# Patient Record
Sex: FEMALE | Race: ASIAN | NOT HISPANIC OR LATINO | URBAN - METROPOLITAN AREA
[De-identification: names, ages, dates, MRNs, and addresses within clinical notes are randomized per-mention and may not be internally consistent; named-entity substitution may affect disease eponyms.]

---

## 2023-02-03 ENCOUNTER — INPATIENT (INPATIENT)
Facility: HOSPITAL | Age: 53
LOS: 0 days | Discharge: LEFT BEFORE DISCHARGE | DRG: 149 | End: 2023-02-04
Attending: INTERNAL MEDICINE | Admitting: INTERNAL MEDICINE
Payer: COMMERCIAL

## 2023-02-03 VITALS
RESPIRATION RATE: 18 BRPM | OXYGEN SATURATION: 95 % | SYSTOLIC BLOOD PRESSURE: 153 MMHG | TEMPERATURE: 98 F | HEART RATE: 94 BPM | DIASTOLIC BLOOD PRESSURE: 88 MMHG | WEIGHT: 190.04 LBS

## 2023-02-03 DIAGNOSIS — N93.8 OTHER SPECIFIED ABNORMAL UTERINE AND VAGINAL BLEEDING: ICD-10-CM

## 2023-02-03 DIAGNOSIS — Z29.9 ENCOUNTER FOR PROPHYLACTIC MEASURES, UNSPECIFIED: ICD-10-CM

## 2023-02-03 DIAGNOSIS — R21 RASH AND OTHER NONSPECIFIC SKIN ERUPTION: ICD-10-CM

## 2023-02-03 DIAGNOSIS — R94.31 ABNORMAL ELECTROCARDIOGRAM [ECG] [EKG]: ICD-10-CM

## 2023-02-03 DIAGNOSIS — Z98.890 OTHER SPECIFIED POSTPROCEDURAL STATES: Chronic | ICD-10-CM

## 2023-02-03 DIAGNOSIS — R55 SYNCOPE AND COLLAPSE: ICD-10-CM

## 2023-02-03 LAB
ALBUMIN SERPL ELPH-MCNC: 3.8 G/DL — SIGNIFICANT CHANGE UP (ref 3.5–5)
ALP SERPL-CCNC: 53 U/L — SIGNIFICANT CHANGE UP (ref 40–120)
ALT FLD-CCNC: 35 U/L DA — SIGNIFICANT CHANGE UP (ref 10–60)
ANION GAP SERPL CALC-SCNC: 6 MMOL/L — SIGNIFICANT CHANGE UP (ref 5–17)
ANISOCYTOSIS BLD QL: SLIGHT — SIGNIFICANT CHANGE UP
AST SERPL-CCNC: 23 U/L — SIGNIFICANT CHANGE UP (ref 10–40)
BASOPHILS # BLD AUTO: 0.07 K/UL — SIGNIFICANT CHANGE UP (ref 0–0.2)
BASOPHILS NFR BLD AUTO: 1.4 % — SIGNIFICANT CHANGE UP (ref 0–2)
BILIRUB SERPL-MCNC: 0.3 MG/DL — SIGNIFICANT CHANGE UP (ref 0.2–1.2)
BUN SERPL-MCNC: 10 MG/DL — SIGNIFICANT CHANGE UP (ref 7–18)
CALCIUM SERPL-MCNC: 9.3 MG/DL — SIGNIFICANT CHANGE UP (ref 8.4–10.5)
CHLORIDE SERPL-SCNC: 105 MMOL/L — SIGNIFICANT CHANGE UP (ref 96–108)
CK MB BLD-MCNC: <1.3 % — SIGNIFICANT CHANGE UP (ref 0–3.5)
CK MB CFR SERPL CALC: <1 NG/ML — SIGNIFICANT CHANGE UP (ref 0–3.6)
CK SERPL-CCNC: 75 U/L — SIGNIFICANT CHANGE UP (ref 21–215)
CO2 SERPL-SCNC: 25 MMOL/L — SIGNIFICANT CHANGE UP (ref 22–31)
CREAT SERPL-MCNC: 0.8 MG/DL — SIGNIFICANT CHANGE UP (ref 0.5–1.3)
EGFR: 88 ML/MIN/1.73M2 — SIGNIFICANT CHANGE UP
ELLIPTOCYTES BLD QL SMEAR: SLIGHT — SIGNIFICANT CHANGE UP
EOSINOPHIL # BLD AUTO: 0.09 K/UL — SIGNIFICANT CHANGE UP (ref 0–0.5)
EOSINOPHIL NFR BLD AUTO: 1.8 % — SIGNIFICANT CHANGE UP (ref 0–6)
FLUAV AG NPH QL: SIGNIFICANT CHANGE UP
FLUBV AG NPH QL: SIGNIFICANT CHANGE UP
GLUCOSE SERPL-MCNC: 112 MG/DL — HIGH (ref 70–99)
HCT VFR BLD CALC: 43.5 % — SIGNIFICANT CHANGE UP (ref 34.5–45)
HGB BLD-MCNC: 13.3 G/DL — SIGNIFICANT CHANGE UP (ref 11.5–15.5)
HYPOCHROMIA BLD QL: SLIGHT — SIGNIFICANT CHANGE UP
IMM GRANULOCYTES NFR BLD AUTO: 0.2 % — SIGNIFICANT CHANGE UP (ref 0–0.9)
LYMPHOCYTES # BLD AUTO: 1.23 K/UL — SIGNIFICANT CHANGE UP (ref 1–3.3)
LYMPHOCYTES # BLD AUTO: 24.4 % — SIGNIFICANT CHANGE UP (ref 13–44)
MANUAL SMEAR VERIFICATION: SIGNIFICANT CHANGE UP
MCHC RBC-ENTMCNC: 22.1 PG — LOW (ref 27–34)
MCHC RBC-ENTMCNC: 30.6 GM/DL — LOW (ref 32–36)
MCV RBC AUTO: 72.4 FL — LOW (ref 80–100)
MICROCYTES BLD QL: SLIGHT — SIGNIFICANT CHANGE UP
MONOCYTES # BLD AUTO: 0.54 K/UL — SIGNIFICANT CHANGE UP (ref 0–0.9)
MONOCYTES NFR BLD AUTO: 10.7 % — SIGNIFICANT CHANGE UP (ref 2–14)
NEUTROPHILS # BLD AUTO: 3.1 K/UL — SIGNIFICANT CHANGE UP (ref 1.8–7.4)
NEUTROPHILS NFR BLD AUTO: 61.5 % — SIGNIFICANT CHANGE UP (ref 43–77)
NRBC # BLD: 0 /100 WBCS — SIGNIFICANT CHANGE UP (ref 0–0)
OVALOCYTES BLD QL SMEAR: SLIGHT — SIGNIFICANT CHANGE UP
PLAT MORPH BLD: NORMAL — SIGNIFICANT CHANGE UP
PLATELET # BLD AUTO: 299 K/UL — SIGNIFICANT CHANGE UP (ref 150–400)
PLATELET COUNT - ESTIMATE: NORMAL — SIGNIFICANT CHANGE UP
POIKILOCYTOSIS BLD QL AUTO: SLIGHT — SIGNIFICANT CHANGE UP
POLYCHROMASIA BLD QL SMEAR: SLIGHT — SIGNIFICANT CHANGE UP
POTASSIUM SERPL-MCNC: 3.7 MMOL/L — SIGNIFICANT CHANGE UP (ref 3.5–5.3)
POTASSIUM SERPL-SCNC: 3.7 MMOL/L — SIGNIFICANT CHANGE UP (ref 3.5–5.3)
PROT SERPL-MCNC: 8 G/DL — SIGNIFICANT CHANGE UP (ref 6–8.3)
RBC # BLD: 6.01 M/UL — HIGH (ref 3.8–5.2)
RBC # FLD: 14.5 % — SIGNIFICANT CHANGE UP (ref 10.3–14.5)
RBC BLD AUTO: ABNORMAL
SARS-COV-2 RNA SPEC QL NAA+PROBE: SIGNIFICANT CHANGE UP
SODIUM SERPL-SCNC: 136 MMOL/L — SIGNIFICANT CHANGE UP (ref 135–145)
TROPONIN I, HIGH SENSITIVITY RESULT: 5.4 NG/L — SIGNIFICANT CHANGE UP
TROPONIN I, HIGH SENSITIVITY RESULT: 6 NG/L — SIGNIFICANT CHANGE UP
WBC # BLD: 5.04 K/UL — SIGNIFICANT CHANGE UP (ref 3.8–10.5)
WBC # FLD AUTO: 5.04 K/UL — SIGNIFICANT CHANGE UP (ref 3.8–10.5)

## 2023-02-03 PROCEDURE — 99285 EMERGENCY DEPT VISIT HI MDM: CPT

## 2023-02-03 PROCEDURE — 70450 CT HEAD/BRAIN W/O DYE: CPT | Mod: 26

## 2023-02-03 PROCEDURE — 99223 1ST HOSP IP/OBS HIGH 75: CPT

## 2023-02-03 PROCEDURE — 99223 1ST HOSP IP/OBS HIGH 75: CPT | Mod: GC

## 2023-02-03 PROCEDURE — 71045 X-RAY EXAM CHEST 1 VIEW: CPT | Mod: 26

## 2023-02-03 RX ORDER — ENOXAPARIN SODIUM 100 MG/ML
40 INJECTION SUBCUTANEOUS EVERY 24 HOURS
Refills: 0 | Status: DISCONTINUED | OUTPATIENT
Start: 2023-02-03 | End: 2023-02-04

## 2023-02-03 RX ORDER — MECLIZINE HCL 12.5 MG
25 TABLET ORAL ONCE
Refills: 0 | Status: COMPLETED | OUTPATIENT
Start: 2023-02-03 | End: 2023-02-03

## 2023-02-03 RX ADMIN — ENOXAPARIN SODIUM 40 MILLIGRAM(S): 100 INJECTION SUBCUTANEOUS at 21:18

## 2023-02-03 RX ADMIN — Medication 25 MILLIGRAM(S): at 14:50

## 2023-02-03 NOTE — H&P ADULT - HISTORY OF PRESENT ILLNESS
Patient is a 53 y/o F, lives alone at home, with no significant PMHx, s/p myomectomy. She had sudden onset of dizziness while working from home. She had a feeling of lightheadedness, loss of balance and unsteadiness. She also states that she has warm burning sensation over her chest. She denies any previous episodes. She had no headache, blurring of vision, head trauma, nausea/vomiting, shortness of breath, palpitation, abdominal pain, diarrhea, dysuria. In the ED, VS showed elevated /88. EKG showed NSR, TWI in V1-V6. Troponin was negative. CTH was ordered. Of note, LMP was 12/2022. She states that she is having vaginal spotting. She attributes some of her symptoms to pre-menopause.    GOC: FULL CODE

## 2023-02-03 NOTE — H&P ADULT - NSHPREVIEWOFSYSTEMS_GEN_ALL_CORE
- CONSTITUTIONAL: Denies fever and chills  - HEENT: Denies changes in vision and hearing.  - RESPIRATORY: Denies SOB and cough.  - CV: Denies chest pain and palpitations  - GI: Denies abdominal pain, nausea, vomiting and diarrhea.  - : Denies dysuria and urinary frequency.  - SKIN: Denies rash and pruritus.  - NEUROLOGICAL: (+) dizziness; Denies headache and syncope.  - PSYCHIATRIC: Denies recent changes in mood. Denies anxiety and depression. - CONSTITUTIONAL: Denies fever and chills  - HEENT: Denies changes in vision and hearing.  - RESPIRATORY: Denies SOB and cough.  - CV: Denies chest pain and palpitations  - GI: Denies abdominal pain, nausea, vomiting and diarrhea.  - : Denies dysuria and urinary frequency.  - SKIN: (+) rashes  - NEUROLOGICAL: (+) dizziness; Denies headache and syncope.  - PSYCHIATRIC: Denies recent changes in mood. Denies anxiety and depression.

## 2023-02-03 NOTE — H&P ADULT - ATTENDING COMMENTS
Patient is admitted with c/o dizziness and feeling hot.  Symptoms occurred suddenly while she was at work on her computer.     PMH is negative Patient is admitted with c/o dizziness and feeling hot.  Symptoms occurred suddenly while she was at work on her computer.     PMH:  fibroids    ROS:  skipper periods, previously heavy,  out of care for several years              increase in pruritic rash    Alert, cooperative woman  vs, as above  PE, as above    EKG, NSR, prolonged QT, T wave inversions in precordial leads                            13.3   5.04  )-----------( 299      ( 03 Feb 2023 14:48 )             43.5       02-03    136  |  105  |  10  ----------------------------<  112<H>  3.7   |  25  |  0.80    Ca    9.3      03 Feb 2023 14:48    TPro  8.0  /  Alb  3.8  /  TBili  0.3  /  DBili  x   /  AST  23  /  ALT  35  /  AlkPhos  53  02-03       CARDIAC MARKERS ( 03 Feb 2023 17:20 )  x     / x     / 75 U/L / x     / <1.0 ng/mL    CAPILLARY BLOOD GLUCOSE      POCT Blood Glucose.: 98 mg/dL (03 Feb 2023 14:00)    < from: CT Head No Cont (02.03.23 @ 17:44) >    IMPRESSION:  No acute intracranial hemorrhage or acute territorial infarction.    < end of copied text >    < from: Xray Chest 1 View- PORTABLE-Urgent (02.03.23 @ 16:03) >    IMPRESSION: Clear lungs.    < end of copied text >    IMP:  Acute dizziness and vasomotor symptoms may reflect menopause.             Abnormal EKG may represent a chronic ischemia, r/o ACS            Nodular rash            Uterine hypertrophy is most likely fibroids.  Patient will need outpatient f/u to exclude uterine malignancy.  Plan: Tele/troponin/echo/Cardiology, Dr. Beach             Refer to outpatient GYN and primary care upon discharge.

## 2023-02-03 NOTE — H&P ADULT - ASSESSMENT
Patient is a 51 y/o F, lives alone at home, with no significant PMHx, s/p myomectomy. Admitted for pre-syncope.

## 2023-02-03 NOTE — H&P ADULT - PROBLEM SELECTOR PLAN 1
p/w pre-syncope, dizziness, lightheadedness, unsteadiness  orthostatic bp  Admit to Telemetry  EKG shows - NSR, TWI  Trop - neg  Neuro consulted: Dr. Denise  Cardio consulted: Dr. Beach  f/u echo  f/u CT head

## 2023-02-03 NOTE — PATIENT PROFILE ADULT - FALL HARM RISK - HARM RISK INTERVENTIONS

## 2023-02-03 NOTE — H&P ADULT - PROBLEM SELECTOR PLAN 3
Lovenox 40 mg SQ for DVt prophylaxis multiple nodular excorations on the arms, legs  could be a sign of systemic disease  follow-up with Dermatology as outpatient

## 2023-02-03 NOTE — ED PROVIDER NOTE - OBJECTIVE STATEMENT
52-year-old female with no significant past medical history presents for dizziness.  She woke up in her usual state at 8 AM after going to bed last night in her usual state and at around 10 AM developed sudden onset of dizziness.  She describes it as a lightheaded feeling.  She denies any weakness or numbness.  She says there is no associated change in vision or incontinence of bowel or bladder.  She not take any medications.  She notes that improves at rest the episode lasted until she arrived here in the emergency department.  Currently still feels a little bit of unsteadiness.  She denies any headache.  There is no chest pain or shortness of breath.  There is no fever or chills.  There is no urinary symptoms.  There is no back pain.  This is the first time she is experiencing this.  She not take any medications at home for this.

## 2023-02-03 NOTE — H&P ADULT - PROBLEM SELECTOR PLAN 5
Patient has palpably enlarged uterus.  She has had heavy periods and a PMH of fibroids, reduced surgically.   She has been out of care for almost ten years.  She will need GYN evaluation as outpatient.

## 2023-02-03 NOTE — PATIENT PROFILE ADULT - WILL THE PATIENT ACCEPT THE PFIZER COVID-19 VACCINE IF ELIGIBLE AND IT IS AVAILABLE?
Order received for a new evaluation.    Are there any red flags that may impact the assessment or management of the patient?   Other - Use Comments. broke pain agreement few years ago  N/A        Routing to review.    Brianda BUSTAMANTE    [facility]     No

## 2023-02-03 NOTE — ED PROVIDER NOTE - PHYSICAL EXAMINATION
Gen. no acute distress, no respiratory  HEENT: Pupils equally round reactive to light at 2 mm, extraocular muscles intact, left nystagmus fatigable, unidirectional.  Lungs: Bilateral breath sounds with clear to auscultation  CVS: S1S2   Abd; soft nontender nondistended  Ext: No edema no erythema  Neuro: Awake alert and oriented x3, no pronator drift, steady gait, normal finger-to-nose, no sensory deficits, clear speech  MSK: Strength is 5 out of 5 for both of her upper extremities and lower extremities including arms and hands and wrists and feet and ankle and knee

## 2023-02-03 NOTE — H&P ADULT - PROBLEM SELECTOR PLAN 2
p/w pre-syncope, dizziness, lightheadedness, unsteadiness  EKG shows - NSR, TWI  Trop - neg  f/u Trop x 2  Cardio consulted: Dr. Beach

## 2023-02-03 NOTE — H&P ADULT - NSHPPHYSICALEXAM_GEN_ALL_CORE
Vital Signs  · BP - 153/88 mm Hg  · Heart Rate - 94 /min  · Respiration Rate - 18 /min  · Temp - 98.2 F (36.8 C)  · SpO2 (%)	 - 95 %    PHYSICAL EXAM:  GENERAL: NAD, speaks in full sentences, no signs of respiratory distress  HEAD:  Atraumatic, Normocephalic  EYES: EOMI, PERRLA, conjunctiva and sclera clear  NECK: Supple, No JVD  CHEST/LUNG: Clear to auscultation bilaterally; No wheeze; No crackles; No accessory muscles used  HEART: Regular rate and rhythm; No murmurs;   ABDOMEN: Soft, Nontender, Nondistended; Bowel sounds present; No guarding  EXTREMITIES:  2+ Peripheral Pulses, No cyanosis or edema  PSYCH: AAOx3  NEUROLOGY: non-focal  SKIN: (+) multiple nodular excorations Vital Signs  · BP - 153/88 mm Hg  · Heart Rate - 94 /min  · Respiration Rate - 18 /min  · Temp - 98.2 F (36.8 C)  · SpO2 (%)	 - 95 %    PHYSICAL EXAM:  GENERAL: NAD, speaks in full sentences, no signs of respiratory distress  HEAD:  Atraumatic, Normocephalic  EYES: EOMI, PERRLA, conjunctiva and sclera clear  NECK: Supple, No JVD  CHEST/LUNG: Clear to auscultation bilaterally; No wheeze; No crackles; No accessory muscles used  HEART: Regular rate and rhythm; No murmurs;   ABDOMEN: Soft, Nontender, Nondistended; Bowel sounds present; No guarding; (+) uterine fullness  EXTREMITIES:  2+ Peripheral Pulses, No cyanosis or edema  PSYCH: AAOx3  NEUROLOGY: non-focal  SKIN: (+) multiple nodular excorations

## 2023-02-03 NOTE — ED ADULT NURSE NOTE - NSIMPLEMENTINTERV_GEN_ALL_ED
Implemented All Universal Safety Interventions:  [place] to call system. Call bell, personal items and telephone within reach. Instruct patient to call for assistance. Room bathroom lighting operational. Non-slip footwear when patient is off stretcher. Physically safe environment: no spills, clutter or unnecessary equipment. Stretcher in lowest position, wheels locked, appropriate side rails in place.

## 2023-02-03 NOTE — PATIENT PROFILE ADULT - MONEY FOR FOOD
2/2 COVID-19. Now with new infiltrates RML/RLL, new fevers concerning for superimposed aspiration/bacterial PNA. Blood/sputum cultures unrevealing. Still intermittently febrile.     - s/p 5 days of remdesivir +  dexamethasone  - Wean O2 as tolerated. Alternate vapotherm + NIV   - Lasix spot dose as needed- Maintains negative fluid balance.   - holding full dose Lovenox for elevated d-dimer in setting of suspected lower GI bleed  -- continue zosyn.. Duration 7 days for presumed aspiration PNA.     Renal function remains stable. Not thriving.. Worried about long term and if he will survive this hospitalization.. Last few days have been challenging for him, but clinically better this AM>    no

## 2023-02-03 NOTE — CONSULT NOTE ADULT - SUBJECTIVE AND OBJECTIVE BOX
NEUROLOGY CONSULT NOTE    NAME:  MADIHA JOY      ASSESSMENT:  52 RHF with new-onset dizziness, likely pre-syncopal event, without clinical or neuroimaging evidence for seizure or ischemic stroke      RECOMMENDATIONS:    - Monitor orthostatic BP & HR with each vital signs check    - Cardiovascular workup: Telemetry, Echocardiogram, Cardiology follow-up    - Plentiful fluid hydration (2 liters, or 8 glasses, of water daily) encouraged    - Patient counseled to maintain caution with rapid changes in position    - DVT ppx: SCDs, Heparin or Enoxaparin          NOTE TO BE COMPLETED - PLEASE REFER TO ABOVE ONLY AND IGNORE INFORMATION BELOW    *******************************      CHIEF COMPLAINT:  Patient is a 52y old  Female who presents with a chief complaint of pre-syncope (03 Feb 2023 16:43)      HPI:  Patient is a 51 y/o F, lives alone at home, with no significant PMHx, s/p myomectomy. She had sudden onset of dizziness while working from home. She had a feeling of lightheadedness, loss of balance and unsteadiness. She also states that she has warm burning sensation over her chest. She denies any previous episodes. She had no headache, blurring of vision, head trauma, nausea/vomiting, shortness of breath, palpitation, abdominal pain, diarrhea, dysuria. In the ED, VS showed elevated /88. EKG showed NSR, TWI in V1-V6. Troponin was negative. CTH was ordered. Of note, LMP was 12/2022. She states that she is having vaginal spotting. She attributes some of her symptoms to pre-menopause.    GOC: FULL CODE (03 Feb 2023 16:43)      NEURO HPI:      PAST MEDICAL & SURGICAL HISTORY:  No pertinent past medical history  H/O myomectomy      MEDICATIONS:  enoxaparin Injectable 40 milliGRAM(s) SubCutaneous every 24 hours      ALLERGIES:  No Known Allergies      FAMILY HISTORY:  FH: hyperlipidemia (Mother)          SOCIAL HISTORY:  Denies alcohol, tobacco, or illicit drug use      REVIEW OF SYSTEMS:  GENERAL: No fever, weight changes, fatigue  EYES: No eye pain or discharge  EAR/NOSE/MOUTH/THROAT: No sinus or throat pain; No difficulty hearing  NECK: No pain or stiffness  RESPIRATORY: No cough, wheezing, chills, or hemoptysis  CARDIOVASCULAR: No chest pain, palpitations, shortness of breath, or dyspnea on exertion  GASTROINTESTINAL: No abdominal pain, nausea, vomiting, hematemesis, diarrhea, or constipation  GENITOURINARY: No dysuria, frequency, hematuria, or incontinence  SKIN: No rashes or lesions  ENDOCRINE: No heat or cold intolerance  HEMATOLOGIC: No easy bruising or bleeding  PSYCHIATRIC: No depression, anxiety, or mood swings  MUSCULOSKELETAL: No joint pain or swelling  NEUROLOGICAL: As per HPI          OBJECTIVE:    Vital Signs Last 24 Hrs  T(C): 36.7 (03 Feb 2023 23:45), Max: 37.1 (03 Feb 2023 18:30)  T(F): 98 (03 Feb 2023 23:45), Max: 98.7 (03 Feb 2023 18:30)  HR: 74 (03 Feb 2023 23:45) (74 - 94)  BP: 124/60 (03 Feb 2023 23:45) (109/47 - 153/88)  RR: 18 (03 Feb 2023 23:45) (18 - 18)  SpO2: 99% (03 Feb 2023 23:45) (95% - 100%)  Parameters below as of 03 Feb 2023 23:45  Patient On (Oxygen Delivery Method): room air      General Examination:  General: No acute distress  HEENT: Atraumatic, Normocephalic  Respiratory: CTA B/l.  No crackles, rhonchi, or wheezes.  Cardiovascular: RRR.  Normal S1 & S2.  Normal b/l radial and pedal pulses.    Neurological Examination:  General / Mental Status: AAO x 3.  No aphasia or dysarthria.  Naming and repetition intact.  Cranial Nerves: VFF x 4.  PERRL.  EOMI x 2, No nystagmus or diplopia.  B/l V1-V3 equal and intact to light touch and pinprick.  Symmetric facial movement and palate elevation.  B/l hearing equal to finger rub.  5/5 strength with b/l sternocleidomastoid & trapezius.  Midline tongue protrusion, with no atrophy or fasciculations.  Motor: Normal bulk & tone in all four extremities.  5/5 strength throughout all four extremities.  No downward drift, rigidity, spasticity, or tremors in any of the four extremities.  Sensory: Intact to light touch and pinprick in all four extremities.  Negative Romberg.  Reflex: 2+ and symmetric at b/l biceps, triceps, brachioradialis, patellae, and ankles.  Downgoing toes b/l.  Coordination: No dysmetria with b/l finger-to-nose and heel raise tests.  Symmetric rapid alternating movements b/l.  Gait: Normal, narrow-based gait.  No difficulty with tiptoe, heel, and tandem gaits.        LABORATORY VALUES:                        13.3   5.04  )-----------( 299      ( 03 Feb 2023 14:48 )             43.5       02-03    136  |  105  |  10  ----------------------------<  112<H>  3.7   |  25  |  0.80    Ca    9.3      03 Feb 2023 14:48    TPro  8.0  /  Alb  3.8  /  TBili  0.3  /  DBili  x   /  AST  23  /  ALT  35  /  AlkPhos  53  02-03                              NEUROIMAGING:          Please contact the Neurology consult service with any neurological questions.    Junior Denise MD   of Neurology  [facility] NEUROLOGY CONSULT NOTE    NAME:  MADIHA JOY      ASSESSMENT:  52 RHF with new-onset dizziness, likely pre-syncopal event, also with neuroimaging evidence of chronic ischemic stroke        RECOMMENDATIONS:    - Monitor orthostatic BP & HR with each vital signs check    - Cardiovascular workup: Telemetry, Echocardiogram, Cardiology follow-up    - Plentiful fluid hydration (2 liters, or 8 glasses, of water daily) encouraged    - Patient counseled to maintain caution with rapid changes in position    - Maintain at minimum Aspirin 81mg PO Daily and Atorvastatin 40mg PO QHS for secondary stroke prevention given presence of chronic infarct    - DVT ppx: SCDs, Heparin or Enoxaparin          *******************************      CHIEF COMPLAINT:  Patient is a 52y old  Female who presents with a chief complaint of pre-syncope (03 Feb 2023 16:43)      HPI:  Patient is a 53 y/o F, lives alone at home, with no significant PMHx, s/p myomectomy. She had sudden onset of dizziness while working from home. She had a feeling of lightheadedness, loss of balance and unsteadiness. She also states that she has warm burning sensation over her chest. She denies any previous episodes. She had no headache, blurring of vision, head trauma, nausea/vomiting, shortness of breath, palpitation, abdominal pain, diarrhea, dysuria. In the ED, VS showed elevated /88. EKG showed NSR, TWI in V1-V6. Troponin was negative. CTH was ordered. Of note, LMP was 12/2022. She states that she is having vaginal spotting. She attributes some of her symptoms to pre-menopause. GOC: FULL CODE (03 Feb 2023 16:43)      NEURO HPI:  52 RHF with new onset dizziness, described as lightheadedness but not as room-spinning sensation, also with burning sensation in the chest. Her dizziness has improved in the ED after receiving Meclizine 25mg PO x 1.      PAST MEDICAL & SURGICAL HISTORY:  No pertinent past medical history  H/O myomectomy      MEDICATIONS:  enoxaparin Injectable 40 milliGRAM(s) SubCutaneous every 24 hours      ALLERGIES:  No Known Allergies      FAMILY HISTORY:  FH: hyperlipidemia (Mother)      SOCIAL HISTORY:  Denies alcohol, tobacco, or illicit drug use      REVIEW OF SYSTEMS:  GENERAL: No fever, weight changes, fatigue  EYES: No eye pain or discharge  EAR/NOSE/MOUTH/THROAT: No sinus or throat pain; No difficulty hearing  NECK: No pain or stiffness  RESPIRATORY: No cough, wheezing, chills, or hemoptysis  CARDIOVASCULAR: Recent burning chest pain; No palpitations, shortness of breath, or dyspnea on exertion  GASTROINTESTINAL: No abdominal pain, nausea, vomiting, hematemesis, diarrhea, or constipation  GENITOURINARY: Recent vaginal spotting; No dysuria, frequency, hematuria, or incontinence  SKIN: No rashes or lesions  ENDOCRINE: No heat or cold intolerance  HEMATOLOGIC: No easy bruising or bleeding  PSYCHIATRIC: No depression, anxiety, or mood swings  MUSCULOSKELETAL: No joint pain or swelling  NEUROLOGICAL: As per HPI          OBJECTIVE:    Vital Signs Last 24 Hrs  T(C): 36.7 (03 Feb 2023 23:45), Max: 37.1 (03 Feb 2023 18:30)  T(F): 98 (03 Feb 2023 23:45), Max: 98.7 (03 Feb 2023 18:30)  HR: 74 (03 Feb 2023 23:45) (74 - 94)  BP: 124/60 (03 Feb 2023 23:45) (109/47 - 153/88)  RR: 18 (03 Feb 2023 23:45) (18 - 18)  SpO2: 99% (03 Feb 2023 23:45) (95% - 100%)  Parameters below as of 03 Feb 2023 23:45  Patient On (Oxygen Delivery Method): room air      General Examination:  General: No acute distress  HEENT: Atraumatic, Normocephalic  Respiratory: CTA B/l.  No crackles, rhonchi, or wheezes.  Cardiovascular: RRR.  Normal S1 & S2.  Normal b/l radial and pedal pulses.    Neurological Examination:  General / Mental Status: AAO x 3.  No aphasia or dysarthria.  Naming and repetition intact.  Cranial Nerves: VFF x 4.  PERRL.  EOMI x 2, No nystagmus or diplopia.  B/l V1-V3 equal and intact to light touch and pinprick.  Symmetric facial movement and palate elevation.  B/l hearing equal to finger rub.  5/5 strength with b/l sternocleidomastoid & trapezius.  Midline tongue protrusion, with no atrophy or fasciculations.  Motor: Normal bulk & tone in all four extremities.  5/5 strength throughout all four extremities.  No downward drift, rigidity, spasticity, or tremors in any of the four extremities.  Sensory: Intact to light touch and pinprick in all four extremities.  Negative Romberg.  Reflex: 2+ and symmetric at b/l biceps, triceps, brachioradialis, patellae, and ankles.  Downgoing toes b/l.  Coordination: No dysmetria with b/l finger-to-nose and heel raise tests.  Symmetric rapid alternating movements b/l.  Gait: Normal, narrow-based gait.  No difficulty with tiptoe, heel, and tandem gaits.    NIHSS 0  MRS 1          LABORATORY VALUES:                        13.3   5.04  )-----------( 299      ( 03 Feb 2023 14:48 )             43.5       02-03    136  |  105  |  10  ----------------------------<  112<H>  3.7   |  25  |  0.80    Ca    9.3      03 Feb 2023 14:48    TPro  8.0  /  Alb  3.8  /  TBili  0.3  /  DBili  x   /  AST  23  /  ALT  35  /  AlkPhos  53  02-03            NEUROIMAGING:      CT Head (2/3/23):  - No acute intracranial abnoramlity  - Chronic left basal ganglia infarct          Please contact the Neurology consult service with any neurological questions.    Junior Denise MD   of Neurology  [facility]

## 2023-02-03 NOTE — CONSULT NOTE ADULT - TIME BILLING
I counseled the patient about her diagnoses and the appropriate medications and conservative steps indicated to manage his symptoms.

## 2023-02-03 NOTE — ED PROVIDER NOTE - CLINICAL SUMMARY MEDICAL DECISION MAKING FREE TEXT BOX
ATTG: : Assessment/plan: Dizziness unclear etiology with an abnormal EKG, will check labs we will check x-ray we will check cardiac work-up we will check CT head and reeval for dispo

## 2023-02-04 ENCOUNTER — TRANSCRIPTION ENCOUNTER (OUTPATIENT)
Age: 53
End: 2023-02-04

## 2023-02-04 VITALS — SYSTOLIC BLOOD PRESSURE: 102 MMHG | HEART RATE: 80 BPM | DIASTOLIC BLOOD PRESSURE: 55 MMHG

## 2023-02-04 LAB
ALBUMIN SERPL ELPH-MCNC: 3.3 G/DL — LOW (ref 3.5–5)
ALP SERPL-CCNC: 41 U/L — SIGNIFICANT CHANGE UP (ref 40–120)
ALT FLD-CCNC: 31 U/L DA — SIGNIFICANT CHANGE UP (ref 10–60)
ANION GAP SERPL CALC-SCNC: 6 MMOL/L — SIGNIFICANT CHANGE UP (ref 5–17)
AST SERPL-CCNC: 20 U/L — SIGNIFICANT CHANGE UP (ref 10–40)
BASOPHILS # BLD AUTO: 0.06 K/UL — SIGNIFICANT CHANGE UP (ref 0–0.2)
BASOPHILS NFR BLD AUTO: 1 % — SIGNIFICANT CHANGE UP (ref 0–2)
BILIRUB SERPL-MCNC: 0.5 MG/DL — SIGNIFICANT CHANGE UP (ref 0.2–1.2)
BUN SERPL-MCNC: 11 MG/DL — SIGNIFICANT CHANGE UP (ref 7–18)
CALCIUM SERPL-MCNC: 9.3 MG/DL — SIGNIFICANT CHANGE UP (ref 8.4–10.5)
CHLORIDE SERPL-SCNC: 107 MMOL/L — SIGNIFICANT CHANGE UP (ref 96–108)
CO2 SERPL-SCNC: 27 MMOL/L — SIGNIFICANT CHANGE UP (ref 22–31)
CREAT SERPL-MCNC: 0.83 MG/DL — SIGNIFICANT CHANGE UP (ref 0.5–1.3)
EGFR: 84 ML/MIN/1.73M2 — SIGNIFICANT CHANGE UP
EOSINOPHIL # BLD AUTO: 0.2 K/UL — SIGNIFICANT CHANGE UP (ref 0–0.5)
EOSINOPHIL NFR BLD AUTO: 3.3 % — SIGNIFICANT CHANGE UP (ref 0–6)
GLUCOSE SERPL-MCNC: 98 MG/DL — SIGNIFICANT CHANGE UP (ref 70–99)
HCT VFR BLD CALC: 38.8 % — SIGNIFICANT CHANGE UP (ref 34.5–45)
HGB BLD-MCNC: 11.7 G/DL — SIGNIFICANT CHANGE UP (ref 11.5–15.5)
IMM GRANULOCYTES NFR BLD AUTO: 0.2 % — SIGNIFICANT CHANGE UP (ref 0–0.9)
LYMPHOCYTES # BLD AUTO: 2.7 K/UL — SIGNIFICANT CHANGE UP (ref 1–3.3)
LYMPHOCYTES # BLD AUTO: 44.4 % — HIGH (ref 13–44)
MAGNESIUM SERPL-MCNC: 2.2 MG/DL — SIGNIFICANT CHANGE UP (ref 1.6–2.6)
MCHC RBC-ENTMCNC: 22.1 PG — LOW (ref 27–34)
MCHC RBC-ENTMCNC: 30.2 GM/DL — LOW (ref 32–36)
MCV RBC AUTO: 73.3 FL — LOW (ref 80–100)
MONOCYTES # BLD AUTO: 0.7 K/UL — SIGNIFICANT CHANGE UP (ref 0–0.9)
MONOCYTES NFR BLD AUTO: 11.5 % — SIGNIFICANT CHANGE UP (ref 2–14)
NEUTROPHILS # BLD AUTO: 2.41 K/UL — SIGNIFICANT CHANGE UP (ref 1.8–7.4)
NEUTROPHILS NFR BLD AUTO: 39.6 % — LOW (ref 43–77)
NRBC # BLD: 0 /100 WBCS — SIGNIFICANT CHANGE UP (ref 0–0)
PHOSPHATE SERPL-MCNC: 4 MG/DL — SIGNIFICANT CHANGE UP (ref 2.5–4.5)
PLATELET # BLD AUTO: 279 K/UL — SIGNIFICANT CHANGE UP (ref 150–400)
POTASSIUM SERPL-MCNC: 3.6 MMOL/L — SIGNIFICANT CHANGE UP (ref 3.5–5.3)
POTASSIUM SERPL-SCNC: 3.6 MMOL/L — SIGNIFICANT CHANGE UP (ref 3.5–5.3)
PROT SERPL-MCNC: 6.9 G/DL — SIGNIFICANT CHANGE UP (ref 6–8.3)
RBC # BLD: 5.29 M/UL — HIGH (ref 3.8–5.2)
RBC # FLD: 14.3 % — SIGNIFICANT CHANGE UP (ref 10.3–14.5)
SODIUM SERPL-SCNC: 140 MMOL/L — SIGNIFICANT CHANGE UP (ref 135–145)
WBC # BLD: 6.08 K/UL — SIGNIFICANT CHANGE UP (ref 3.8–10.5)
WBC # FLD AUTO: 6.08 K/UL — SIGNIFICANT CHANGE UP (ref 3.8–10.5)

## 2023-02-04 PROCEDURE — 93005 ELECTROCARDIOGRAM TRACING: CPT

## 2023-02-04 PROCEDURE — G1004: CPT

## 2023-02-04 PROCEDURE — 93306 TTE W/DOPPLER COMPLETE: CPT

## 2023-02-04 PROCEDURE — 82962 GLUCOSE BLOOD TEST: CPT

## 2023-02-04 PROCEDURE — 84100 ASSAY OF PHOSPHORUS: CPT

## 2023-02-04 PROCEDURE — 83735 ASSAY OF MAGNESIUM: CPT

## 2023-02-04 PROCEDURE — 71045 X-RAY EXAM CHEST 1 VIEW: CPT

## 2023-02-04 PROCEDURE — 84484 ASSAY OF TROPONIN QUANT: CPT

## 2023-02-04 PROCEDURE — 80053 COMPREHEN METABOLIC PANEL: CPT

## 2023-02-04 PROCEDURE — 82553 CREATINE MB FRACTION: CPT

## 2023-02-04 PROCEDURE — 99285 EMERGENCY DEPT VISIT HI MDM: CPT

## 2023-02-04 PROCEDURE — 82550 ASSAY OF CK (CPK): CPT

## 2023-02-04 PROCEDURE — 85025 COMPLETE CBC W/AUTO DIFF WBC: CPT

## 2023-02-04 PROCEDURE — 87637 SARSCOV2&INF A&B&RSV AMP PRB: CPT

## 2023-02-04 PROCEDURE — 96372 THER/PROPH/DIAG INJ SC/IM: CPT

## 2023-02-04 PROCEDURE — 70450 CT HEAD/BRAIN W/O DYE: CPT | Mod: MG

## 2023-02-04 PROCEDURE — 99239 HOSP IP/OBS DSCHRG MGMT >30: CPT | Mod: GC

## 2023-02-04 PROCEDURE — 36415 COLL VENOUS BLD VENIPUNCTURE: CPT

## 2023-02-04 NOTE — DISCHARGE NOTE PROVIDER - NSDCCPCAREPLAN_GEN_ALL_CORE_FT
PRINCIPAL DISCHARGE DIAGNOSIS  Diagnosis: Pre-syncope  Assessment and Plan of Treatment: You presented to the hospital with complaints of feeling lightheadedness, loss of balance and unsteadiness. All your lab work was normal. CT head was negative for any brain bleed. Chest x ray was clear. You were seen by a neurologist, no further work up was necassary. Cardiology recomended an echocardiogram which showed _____________. Please follow up with your primary care doctor within 1 week of discharge.       PRINCIPAL DISCHARGE DIAGNOSIS  Diagnosis: Pre-syncope  Assessment and Plan of Treatment: You presented to the hospital with complaints of feeling lightheadedness, loss of balance and unsteadiness. All your lab work was normal. CT head was negative for any brain bleed. Chest x ray was clear. You were seen by a neurologist, no further work up was necassary. Cardiology recomended an echocardiogram which showed _____________. Please follow up with your primary care doctor and the cardiologist within 1 week of discharge.      SECONDARY DISCHARGE DIAGNOSES  Diagnosis: Rash  Assessment and Plan of Treatment: You presented with multiple nodular excorations on both arms. Please follow up with a dermatologt for further evaluations.     PRINCIPAL DISCHARGE DIAGNOSIS  Diagnosis: Abnormal EKG  Assessment and Plan of Treatment: You presented to the hospital with complaints of feeling lightheadedness, loss of balance and unsteadiness. All your lab work was normal. CT head was negative for any brain bleed. Chest x ray was clear. You were seen by a neurologist, no further work up was necassary. Your EKG was abnormal. Cardiology recomended an echocardiogram, please call us back to find out the result. Please follow up with your primary care doctor and the cardiologist within 1 week of discharge.      SECONDARY DISCHARGE DIAGNOSES  Diagnosis: Rash  Assessment and Plan of Treatment: You presented with multiple nodular excorations on both arms. 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 The exact cause of dermatitis herpetiformis isn't known. Genetics, gluten sensitivity, and disorders in which the immune system attacks healthy cells (autoimmune disorders) may play a role.Please follow up with a dermatologist for further evaluations.    Diagnosis: Abnormal uterine and vaginal bleeding, unspecified  Assessment and Plan of Treatment: You have a history of uterine fibroids. You have a surgical history of a myomectomy. You had complaints of abnormal bleeding. Please follow up with the OBGYN specialts provided at the bottom of this summary.

## 2023-02-04 NOTE — DISCHARGE NOTE NURSING/CASE MANAGEMENT/SOCIAL WORK - NSDCPEFALRISK_GEN_ALL_CORE
For information on Fall & Injury Prevention, visit: [email or web address] OR  [email or web address] OR  https://www.cdc.gov/steadi/patient.html

## 2023-02-04 NOTE — DISCHARGE NOTE NURSING/CASE MANAGEMENT/SOCIAL WORK - PATIENT PORTAL LINK FT
You can access the FollowMyHealth Patient Portal offered by [facility] by registering at the following website: [email or web address]. By joining [email or web address] FollowMyHealth portal, you will also be able to view your health information using other applications (apps) compatible with our system.

## 2023-02-04 NOTE — PROGRESS NOTE ADULT - ATTENDING COMMENTS
Patient is alert and cooperative.   No more dizziness  No chest pain    Vital Signs Last 24 Hrs  T(C): 37.1 (04 Feb 2023 16:03), Max: 37.1 (03 Feb 2023 18:30)  T(F): 98.7 (04 Feb 2023 16:03), Max: 98.7 (03 Feb 2023 18:30)  HR: 80 (04 Feb 2023 16:20) (69 - 94)  BP: 102/55 (04 Feb 2023 16:20) (92/47 - 135/72)  BP(mean): --  RR: 18 (04 Feb 2023 16:03) (18 - 18)  SpO2: 100% (04 Feb 2023 16:03) (98% - 100%)    Parameters below as of 04 Feb 2023 16:03  Patient On (Oxygen Delivery Method): room air    Discrete diffuse pruritic nodular eruption  Lungs, clear  Cor, RRR  Abdomen, soft                          11.7   6.08  )-----------( 279      ( 04 Feb 2023 06:30 )             38.8   02-04    140  |  107  |  11  ----------------------------<  98  3.6   |  27  |  0.83    Ca    9.3      04 Feb 2023 06:30  Phos  4.0     02-04  Mg     2.2     02-04    TPro  6.9  /  Alb  3.3<L>  /  TBili  0.5  /  DBili  x   /  AST  20  /  ALT  31  /  AlkPhos  41  02-04    IMP:  dizziness, resolved          abnormal EKG, echo, pending          pruritic nodular rash, 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          Discharge home with outpatient f/u DUB, rash, and primary care.

## 2023-02-04 NOTE — CHART NOTE - NSCHARTNOTEFT_GEN_A_CORE
Explanation of Leaving AMA:  Throughout our interactions the patient has demonstrated concrete thinking/reasoning, has maintained an orderly/reasonable conversation, appears to have intact insight/judgment/reason, is A+Ox4, and appears of a sound mind and therefore in our opinion, does not have an indication to presume lack of capacity. All medical information and discussions were communicated (in laymen's terms), including the teams' clinical concerns.  The patient verbalized an understanding of our worries completing a teach back.  We’ve communicated to the patient that their hospital course is incomplete and there is the potential that conditions that are threatening to life have not been ruled out. Our discussions included the potential outcomes of leaving AMA, including worsening of their condition, becoming permanently disabled/in pain/critically ill, or death.  Despite these efforts, we were unable to convince the patient to stay. The patient is refusing any further care and is leaving against medical advice. Patient was provided with follow up care. Patient was informed that they may call 911 or return to the hospital via the ED at any time.    Time patient left AMA:   02-04-23 @ 18:06 Was notified by the RN that pt wishes to leave AMA. Spoke to pt at the bedside, throughout our interactions the patient has demonstrated concrete thinking/reasoning, has maintained an orderly/reasonable conversation, appears to have intact insight/judgment/reason, is A+Ox4, and appears of a sound mind and therefore in our opinion, does not have an indication to presume lack of capacity. All medical information and discussions were communicated (in laymen's terms), including the teams' clinical concerns. The patient verbalized an understanding of our worries completing a teach back.  We’ve communicated to the patient that their hospital course is incomplete and there is the potential that conditions that are threatening to life have not been ruled out. Our discussions included the potential outcomes of leaving AMA, including worsening of their condition, becoming permanently disabled/in pain/critically ill, or death.  Despite these efforts, we were unable to convince the patient to stay. The patient is refusing any further care and is leaving against medical advice. Patient was provided with discharge paperwork/instructions and follow up care. Patient was informed that they may call 911 or return to the hospital via the ED at any time.

## 2023-02-04 NOTE — DISCHARGE NOTE PROVIDER - ATTENDING DISCHARGE PHYSICAL EXAMINATION:
Alert, cooperative woman in NAD  Vital Signs Last 24 Hrs  T(C): 37.1 (04 Feb 2023 16:03), Max: 37.1 (03 Feb 2023 18:30)  T(F): 98.7 (04 Feb 2023 16:03), Max: 98.7 (03 Feb 2023 18:30)  HR: 80 (04 Feb 2023 16:20) (69 - 94)  BP: 102/55 (04 Feb 2023 16:20) (92/47 - 135/72)  BP(mean): --  RR: 18 (04 Feb 2023 16:03) (18 - 18)  SpO2: 100% (04 Feb 2023 16:03) (98% - 100%)    Parameters below as of 04 Feb 2023 16:03  Patient On (Oxygen Delivery Method): room air    Diffuse rash of discrete, pruritic nodules  Lungs, clear  Cor, RRR  Abdomen, soft  Ext, no edema  Neurological, intact  Echo, performed, but not read.

## 2023-02-04 NOTE — DISCHARGE NOTE PROVIDER - HOSPITAL COURSE
Patient is a 53 y/o F, lives alone at home, with no significant PMHx, s/p myomectomy. She had sudden onset of dizziness while working from home. She had a feeling of lightheadedness, loss of balance and unsteadiness. She also states that she has warm burning sensation over her chest. She denies any previous episodes. She had no headache, blurring of vision, head trauma, nausea/vomiting, shortness of breath, palpitation, abdominal pain, diarrhea, dysuria. In the ED, VS showed elevated /88. EKG showed NSR, TWI in V1-V6. Troponin was negative. CTH was ordered. Of note, LMP was 12/2022. She states that she is having vaginal spotting. She attributes some of her symptoms to pre-menopause. Seen by neuro recs for cardio work up. Seen by cardio, EKG reviewed, rec to have an echocardiogram. Patient is able to ambulate and tolerate diet prior to discharge. Patient is stable for discharge per attending and is advised to follow up with PCP as outpatient. Please refer to patient's complete medical chart with documents for a full hospital course, for this is only a brief summary.       Patient is a 51 y/o F, lives alone at home, with no significant PMHx, s/p myomectomy. She had sudden onset of dizziness while working from home. She had a feeling of lightheadedness, loss of balance and unsteadiness. She also states that she has warm burning sensation over her chest. She denies any previous episodes. She had no headache, blurring of vision, head trauma, nausea/vomiting, shortness of breath, palpitation, abdominal pain, diarrhea, dysuria. In the ED, VS showed elevated /88. EKG showed NSR, TWI in V1-V6. Troponin was negative. CTH was ordered. Of note, LMP was 12/2022. She states that she is having vaginal spotting. She attributes some of her symptoms to pre-menopause. Seen by neuro recs for cardio work up. Seen by cardio, EKG reviewed, rec to have an echocardiogram. Echocardiogram read was pending, pt wants to leave AMA, all risks explained, discussed with attending. Patient is able to ambulate and tolerate diet prior to leave AMA, denies any chest pain and states she feels well. Please refer to patient's complete medical chart with documents for a full hospital course, for this is only a brief summary.

## 2023-02-04 NOTE — DISCHARGE NOTE PROVIDER - NSFOLLOWUPCLINICS_GEN_ALL_ED_FT
[facility]  Internal Medicine  [address] [place] [address]  Phone: [phone]  Fax: [phone]  Follow Up Time: 1 week    [facility]  [address] [place] [address]  Phone: [phone]  Fax: [phone]  Follow Up Time: 1 week

## 2023-02-04 NOTE — PROGRESS NOTE ADULT - PROBLEM SELECTOR PLAN 2
p/w pre-syncope, dizziness, lightheadedness, unsteadiness  EKG shows - NSR, TWI  Trop - neg x 2  Cardio following Dr. Beach

## 2023-02-04 NOTE — PROGRESS NOTE ADULT - PROBLEM SELECTOR PLAN 3
multiple nodular excorations on the arms, legs  could be a sign of systemic disease  follow-up with Dermatology as outpatient

## 2023-02-04 NOTE — DISCHARGE NOTE PROVIDER - CARE PROVIDER_API CALL
Jose Beach)  Cardiology  [address] [place] [address]  Phone: [phone]  Fax: [phone]  Follow Up Time: 2 weeks

## 2023-02-04 NOTE — PROGRESS NOTE ADULT - SUBJECTIVE AND OBJECTIVE BOX
PGY-1 Progress Note discussed with attending    TEAMS or PAGER #: [[phone]]  TILL 5:00 PM  PLEASE CONTACT ON CALL TEAM:  - On Call Team (Please refer to Jazmin) FROM 5:00 PM - 8:30PM  - Nightfloat Team FROM 8:30 -7:30 AM      INTERVAL HPI/OVERNIGHT EVENTS: No events overnight. Pt assessed at the bedside, in NAD, denies any chest pain, sob, fever, chills, n/v/d. Will continue to monitor.         MEDICATIONS  (STANDING):  enoxaparin Injectable 40 milliGRAM(s) SubCutaneous every 24 hours    MEDICATIONS  (PRN):      REVIEW OF SYSTEMS:  CONSTITUTIONAL: No fever, weight loss, or fatigue  RESPIRATORY: No cough, wheezing, chills or hemoptysis; No shortness of breath  CARDIOVASCULAR: No chest pain, palpitations, dizziness, or leg swelling  GASTROINTESTINAL: No abdominal pain. No nausea, vomiting, or hematemesis; No diarrhea or constipation. No melena or hematochezia.  GENITOURINARY: No dysuria or hematuria, urinary frequency  NEUROLOGICAL: No headaches, memory loss, loss of strength, numbness, or tremors  SKIN: No itching, burning, rashes, or lesions     Vital Signs Last 24 Hrs  T(C): 36.8 (04 Feb 2023 07:24), Max: 37.1 (03 Feb 2023 18:30)  T(F): 98.2 (04 Feb 2023 07:24), Max: 98.7 (03 Feb 2023 18:30)  HR: 70 (04 Feb 2023 07:24) (69 - 94)  BP: 110/60 (04 Feb 2023 07:24) (100/52 - 153/88)  BP(mean): --  RR: 18 (04 Feb 2023 07:24) (18 - 18)  SpO2: 99% (04 Feb 2023 07:24) (95% - 100%)    Parameters below as of 04 Feb 2023 07:24  Patient On (Oxygen Delivery Method): room air        PHYSICAL EXAMINATION:  GENERAL: NAD, well-developed, well-groomed  HEAD:  Atraumatic, Normocephalic  EYES:  conjunctiva and sclera clear  NECK: Supple, No JVD, Normal thyroid  CHEST/LUNG: Clear to auscultation. Clear to percussion bilaterally; No rales, rhonchi, wheezing, or rubs  HEART: Regular rate and rhythm; No murmurs, rubs, or gallops  ABDOMEN: Soft, Nontender, Nondistended; Bowel sounds present  NERVOUS SYSTEM:  Alert & Oriented X3,    EXTREMITIES:  2+ Peripheral Pulses, No clubbing, cyanosis, or edema  SKIN: warm dry                          11.7   6.08  )-----------( 279      ( 04 Feb 2023 06:30 )             38.8     02-04    140  |  107  |  11  ----------------------------<  98  3.6   |  27  |  0.83    Ca    9.3      04 Feb 2023 06:30  Phos  4.0     02-04  Mg     2.2     02-04    TPro  6.9  /  Alb  3.3<L>  /  TBili  0.5  /  DBili  x   /  AST  20  /  ALT  31  /  AlkPhos  41  02-04    LIVER FUNCTIONS - ( 04 Feb 2023 06:30 )  Alb: 3.3 g/dL / Pro: 6.9 g/dL / ALK PHOS: 41 U/L / ALT: 31 U/L DA / AST: 20 U/L / GGT: x           CARDIAC MARKERS ( 03 Feb 2023 17:20 )  x     / x     / 75 U/L / x     / <1.0 ng/mL          CAPILLARY BLOOD GLUCOSE      RADIOLOGY & ADDITIONAL TESTS:

## 2023-02-04 NOTE — PROGRESS NOTE ADULT - PROBLEM SELECTOR PLAN 1
p/w pre-syncope, dizziness, lightheadedness, unsteadiness  orthostatic bp  Admit to Telemetry  EKG shows - NSR, TWI  Trop - neg  Neuro consulted: Dr. Denise  Cardio consulted: Dr. Beach  echo pending  CT head-No acute intracranial hemorrhage or acute territorial infarction.

## 2023-02-04 NOTE — CONSULT NOTE ADULT - SUBJECTIVE AND OBJECTIVE BOX
DATE OF SERVICE:02-04-23 @ 12:06    Patient was seen,examined and evaluated  by me.ER evaluation, Labs and Hospital course was reviewed,    CHIEF COMPLAINT:    HPI:HPI:  Patient is a 53 y/o F, lives alone at home, with no significant PMHx, s/p myomectomy. She had sudden onset of dizziness while working from home. She had a feeling of lightheadedness, loss of balance and unsteadiness. She also states that she has warm burning sensation over her chest. She denies any previous episodes. She had no headache, blurring of vision, head trauma, nausea/vomiting, shortness of breath, palpitation, abdominal pain, diarrhea, dysuria. In the ED, VS showed elevated /88. EKG showed NSR, TWI in V1-V6. Troponin was negative. CTH was ordered. Of note, LMP was 12/2022. She states that she is having vaginal spotting. She attributes some of her symptoms to pre-menopause.    GOC: FULL CODE (03 Feb 2023 16:43)      PAST MEDICAL & SURGICAL HISTORY:  No pertinent past medical history      H/O myomectomy          MEDICATIONS  (STANDING):  enoxaparin Injectable 40 milliGRAM(s) SubCutaneous every 24 hours    MEDICATIONS  (PRN):      FAMILY HISTORY:  FH: hyperlipidemia (Mother)      No family history of premature coronary artery disease or sudden cardiac death    SOCIAL HISTORY:  Smoking-[ ] Active  [ ] Former [ ] Non Smoker  Alcohol-[ ] Denies [ ] Social [ ] Daily  Ilicit Drug use-[ ] Denies [ ] Active user    REVIEW OF SYSTEMS:  Constitutional: [ ] fever, [ ]weight loss, [ ]fatigue   Activity [ ] Bedbound,[ ] Ambulates [ ] Unassisted[ ] Cane/Walker [ ] Assistence.  Effort tolerance:[ ] Excellent [ ] Good [ ] Fair [ ] Poor [ ]  Eyes: [ ] visual changes  Respiratory: [ ]shortness of breath;  [ ] cough, [ ]wheezing, [ ]chills, [ ]hemoptysis  Cardiovascular: [ ] chest pain, [ ]palpitations, [ ]dizziness,  [ ]leg swelling[ ]orthopnea [ ]PND  Gastrointestinal: [ ] abdominal pain, [ ]nausea, [ ]vomiting,  [ ]diarrhea,[ ]constipation  Genitourinary: [ ] dysuria, [ ] hematuria  Neurologic: [ ] headaches [ ] tremors[ ] weakness  Skin: [ ] itching, [ ]burning, [ ] rashes  Endocrine: [ ] heat or cold intolerance  Musculoskeletal: [ ] joint pain or swelling; [ ] muscle, back, or extremity pain  Psychiatric: [ ] depression, [ ]anxiety, [ ]mood swings, or [ ]difficulty sleeping  Hematologic: [ ] easy bruising, [ ] bleeding gums       [ x] All others negative	  [ ] Unable to obtain    Vital Signs Last 24 Hrs  T(C): 36.9 (04 Feb 2023 11:11), Max: 37.1 (03 Feb 2023 18:30)  T(F): 98.4 (04 Feb 2023 11:11), Max: 98.7 (03 Feb 2023 18:30)  HR: 78 (04 Feb 2023 11:11) (69 - 94)  BP: 104/70 (04 Feb 2023 11:11) (100/52 - 153/88)  BP(mean): --  RR: 18 (04 Feb 2023 11:11) (18 - 18)  SpO2: 98% (04 Feb 2023 11:11) (95% - 100%)    Parameters below as of 04 Feb 2023 11:11  Patient On (Oxygen Delivery Method): room air      I&O's Summary    04 Feb 2023 07:01  -  04 Feb 2023 12:06  --------------------------------------------------------  IN: 450 mL / OUT: 0 mL / NET: 450 mL        PHYSICAL EXAM:  General: No acute distress BMI-  HEENT: EOMI, PERRL[ ] Icteric  Neck: Supple, No JVD  Lungs: Equal air entry bilaterally; [ ] Rales [ ] Rhonchi [ ] Wheezing  Heart: Regular rate and rhythm;[ ] Murmurs-   /6 [ ] Systolic [ ] Diastolic [ ] Radiation,No rubs, or gallops  Abdomen: Nontender, bowel sounds present  Extremities: No clubbing, cyanosis, or edema[ ] Calf tenderness  Nervous system:  Alert & Oriented X3, no focal deficits  Psychiatric: Normal affect  Skin: No rashes or lesions      LABS:  02-04    140  |  107  |  11  ----------------------------<  98  3.6   |  27  |  0.83    Ca    9.3      04 Feb 2023 06:30  Phos  4.0     02-04  Mg     2.2     02-04    TPro  6.9  /  Alb  3.3<L>  /  TBili  0.5  /  DBili  x   /  AST  20  /  ALT  31  /  AlkPhos  41  02-04    Creatinine Trend: 0.83<--, 0.80<--                        11.7   6.08  )-----------( 279      ( 04 Feb 2023 06:30 )             38.8         Lipid Panel:   Cardiac Enzymes: CARDIAC MARKERS ( 03 Feb 2023 17:20 )  x     / x     / 75 U/L / x     / <1.0 ng/mL            RADIOLOGY:    ECG [my interpretation]:    TELEMETRY:    ECHO:    STRESS TEST:    CATHETERIZATION:

## 2023-02-06 ENCOUNTER — TRANSCRIPTION ENCOUNTER (OUTPATIENT)
Age: 53
End: 2023-02-06

## 2023-02-06 PROBLEM — Z00.00 ENCOUNTER FOR PREVENTIVE HEALTH EXAMINATION: Status: ACTIVE | Noted: 2023-02-06

## 2024-05-25 ENCOUNTER — NON-APPOINTMENT (OUTPATIENT)
Age: 54
End: 2024-05-25

## 2025-05-30 ENCOUNTER — NON-APPOINTMENT (OUTPATIENT)
Age: 55
End: 2025-05-30